# Patient Record
Sex: FEMALE
[De-identification: names, ages, dates, MRNs, and addresses within clinical notes are randomized per-mention and may not be internally consistent; named-entity substitution may affect disease eponyms.]

---

## 2018-11-08 LAB — CRC RECOMMENDATION EXT: NORMAL

## 2020-07-23 ENCOUNTER — NURSE TRIAGE (OUTPATIENT)
Dept: OTHER | Facility: CLINIC | Age: 65
End: 2020-07-23

## 2020-07-23 NOTE — TELEPHONE ENCOUNTER
Reason for Disposition   Fall in systolic BP > 20 mm Hg from normal and feeling dizzy, lightheaded, or weak    Protocols used: LOW BLOOD PRESSURE-ADULT-[place]    Pt calling for MMO benefit. She states last night she had an episode of her HR getting to 140 and her BP was low. This am she states her BP is 90/60. Her normal systolic BP is 006-778. She is also weak. No issues with HR this am.    Triage indicates for pt to be seen in the ED/ [facility] or PCP office with approval. Pt instructed to call PCP office and follow their instruction. Pt instructed to call back for any new or worsening symptoms. Patient verbalized understanding. Patient denies any other questions or concerns. Please do not respond to the triage nurse through this encounter. Any subsequent communication should be directly with the patient.

## 2021-06-23 ENCOUNTER — HISTORICAL (OUTPATIENT)
Dept: ADMINISTRATIVE | Facility: HOSPITAL | Age: 66
End: 2021-06-23

## 2021-06-23 LAB
ABS NEUT (OLG): 4.75 X10(3)/MCL (ref 2.1–9.2)
ALBUMIN SERPL-MCNC: 4 GM/DL (ref 3.4–4.8)
ALBUMIN/GLOB SERPL: 1.6 RATIO (ref 1.1–2)
ALP SERPL-CCNC: 87 UNIT/L (ref 40–150)
ALT SERPL-CCNC: 11 UNIT/L (ref 0–55)
AST SERPL-CCNC: 13 UNIT/L (ref 5–34)
BASOPHILS # BLD AUTO: 0.1 X10(3)/MCL (ref 0–0.2)
BASOPHILS NFR BLD AUTO: 1 %
BILIRUB SERPL-MCNC: 0.3 MG/DL
BILIRUBIN DIRECT+TOT PNL SERPL-MCNC: 0.1 MG/DL (ref 0–0.5)
BILIRUBIN DIRECT+TOT PNL SERPL-MCNC: 0.2 MG/DL (ref 0–0.8)
BUN SERPL-MCNC: 21.9 MG/DL (ref 9.8–20.1)
BURR CELLS BLD QL SMEAR: 1 (ref 0–0)
CALCIUM SERPL-MCNC: 9 MG/DL (ref 8.4–10.2)
CHLORIDE SERPL-SCNC: 103 MMOL/L (ref 98–107)
CHOLEST SERPL-MCNC: 204 MG/DL
CHOLEST/HDLC SERPL: 3 {RATIO} (ref 0–5)
CO2 SERPL-SCNC: 20 MMOL/L (ref 23–31)
CREAT SERPL-MCNC: 1.16 MG/DL (ref 0.55–1.02)
DEPRECATED CALCIDIOL+CALCIFEROL SERPL-MC: 36.8 NG/ML (ref 30–80)
EOSINOPHIL # BLD AUTO: 0.1 X10(3)/MCL (ref 0–0.9)
EOSINOPHIL NFR BLD AUTO: 2 %
ERYTHROCYTE [DISTWIDTH] IN BLOOD BY AUTOMATED COUNT: 12.2 % (ref 11.5–17)
EST. AVERAGE GLUCOSE BLD GHB EST-MCNC: 114 MG/DL
GLOBULIN SER-MCNC: 2.5 GM/DL (ref 2.4–3.5)
GLUCOSE SERPL-MCNC: 119 MG/DL (ref 82–115)
HBA1C MFR BLD: 5.6 %
HCT VFR BLD AUTO: 47.2 % (ref 37–47)
HDLC SERPL-MCNC: 63 MG/DL (ref 35–60)
HGB BLD-MCNC: 16.1 GM/DL (ref 12–16)
HIV 1+2 AB+HIV1 P24 AG SERPL QL IA: NONREACTIVE
LDLC SERPL CALC-MCNC: 110 MG/DL (ref 50–140)
LYMPHOCYTES # BLD AUTO: 1.1 X10(3)/MCL (ref 0.6–4.6)
LYMPHOCYTES NFR BLD AUTO: 17 %
MCH RBC QN AUTO: 30.7 PG (ref 27–31)
MCHC RBC AUTO-ENTMCNC: 34.1 GM/DL (ref 33–36)
MCV RBC AUTO: 89.9 FL (ref 80–94)
MONOCYTES # BLD AUTO: 0.4 X10(3)/MCL (ref 0.1–1.3)
MONOCYTES NFR BLD AUTO: 7 %
NEUTROPHILS # BLD AUTO: 4.75 X10(3)/MCL (ref 2.1–9.2)
NEUTROPHILS NFR BLD AUTO: 73 %
PLATELET # BLD AUTO: 344 X10(3)/MCL (ref 130–400)
PLATELET # BLD EST: NORMAL 10*3/UL
PMV BLD AUTO: 11.1 FL (ref 7.4–10.4)
POIKILOCYTOSIS BLD QL SMEAR: 1
POTASSIUM SERPL-SCNC: 3 MMOL/L (ref 3.5–5.1)
PROT SERPL-MCNC: 6.5 GM/DL (ref 5.8–7.6)
RBC # BLD AUTO: 5.25 X10(6)/MCL (ref 4.2–5.4)
SODIUM SERPL-SCNC: 142 MMOL/L (ref 136–145)
TRIGL SERPL-MCNC: 155 MG/DL (ref 37–140)
TROPONIN I SERPL-MCNC: <0.01 NG/ML (ref 0–0.04)
VLDLC SERPL CALC-MCNC: 31 MG/DL
WBC # SPEC AUTO: 6.5 X10(3)/MCL (ref 4.5–11.5)

## 2021-07-14 ENCOUNTER — HISTORICAL (OUTPATIENT)
Dept: ADMINISTRATIVE | Facility: HOSPITAL | Age: 66
End: 2021-07-14

## 2021-07-14 LAB
ALBUMIN SERPL-MCNC: 3.6 GM/DL (ref 3.4–4.8)
ALBUMIN/GLOB SERPL: 1.3 RATIO (ref 1.1–2)
ALP SERPL-CCNC: 76 UNIT/L (ref 40–150)
ALT SERPL-CCNC: 12 UNIT/L (ref 0–55)
APPEARANCE, UA: CLEAR
AST SERPL-CCNC: 21 UNIT/L (ref 5–34)
BACTERIA SPEC CULT: ABNORMAL /HPF
BILIRUB SERPL-MCNC: 0.4 MG/DL
BILIRUB UR QL STRIP: NEGATIVE
BILIRUBIN DIRECT+TOT PNL SERPL-MCNC: 0.1 MG/DL (ref 0–0.5)
BILIRUBIN DIRECT+TOT PNL SERPL-MCNC: 0.3 MG/DL (ref 0–0.8)
BUN SERPL-MCNC: 15.2 MG/DL (ref 9.8–20.1)
CALCIUM SERPL-MCNC: 8.9 MG/DL (ref 8.4–10.2)
CHLORIDE SERPL-SCNC: 106 MMOL/L (ref 98–107)
CO2 SERPL-SCNC: 20 MMOL/L (ref 23–31)
COLOR UR: YELLOW
CREAT SERPL-MCNC: 0.93 MG/DL (ref 0.55–1.02)
GLOBULIN SER-MCNC: 2.8 GM/DL (ref 2.4–3.5)
GLUCOSE (UA): NEGATIVE
GLUCOSE SERPL-MCNC: 143 MG/DL (ref 82–115)
HGB UR QL STRIP: NEGATIVE
KETONES UR QL STRIP: NEGATIVE
LEUKOCYTE ESTERASE UR QL STRIP: ABNORMAL
NITRITE UR QL STRIP: POSITIVE
PH UR STRIP: 5 [PH] (ref 5–9)
POTASSIUM SERPL-SCNC: 5.4 MMOL/L (ref 3.5–5.1)
PROT SERPL-MCNC: 6.4 GM/DL (ref 5.8–7.6)
PROT UR QL STRIP: NEGATIVE
RBC #/AREA URNS HPF: ABNORMAL /[HPF]
SODIUM SERPL-SCNC: 138 MMOL/L (ref 136–145)
SP GR UR STRIP: 1.01 (ref 1–1.03)
SQUAMOUS EPITHELIAL, UA: ABNORMAL /HPF (ref 0–4)
UROBILINOGEN UR STRIP-ACNC: 0.2
WBC #/AREA URNS HPF: 9 /HPF (ref 0–3)

## 2022-02-03 ENCOUNTER — HISTORICAL (OUTPATIENT)
Dept: ADMINISTRATIVE | Facility: HOSPITAL | Age: 67
End: 2022-02-03

## 2022-02-03 LAB
ALBUMIN SERPL-MCNC: 3.8 G/DL (ref 3.4–4.8)
ALBUMIN/GLOB SERPL: 1.5 {RATIO} (ref 1.1–2)
ALP SERPL-CCNC: 76 U/L (ref 40–150)
ALT SERPL-CCNC: 11 U/L (ref 0–55)
AST SERPL-CCNC: 13 U/L (ref 5–34)
BILIRUB SERPL-MCNC: 0.5 MG/DL
BILIRUBIN DIRECT+TOT PNL SERPL-MCNC: 0.2 (ref 0–0.5)
BILIRUBIN DIRECT+TOT PNL SERPL-MCNC: 0.3 (ref 0–0.8)
BUN SERPL-MCNC: 15.7 MG/DL (ref 9.8–20.1)
CALCIUM SERPL-MCNC: 9.7 MG/DL (ref 8.7–10.5)
CHLORIDE SERPL-SCNC: 104 MMOL/L (ref 98–107)
CO2 SERPL-SCNC: 26 MMOL/L (ref 23–31)
CREAT SERPL-MCNC: 0.76 MG/DL (ref 0.55–1.02)
EST. AVERAGE GLUCOSE BLD GHB EST-MCNC: 108.3 MG/DL
GLOBULIN SER-MCNC: 2.6 G/DL (ref 2.4–3.5)
GLUCOSE SERPL-MCNC: 88 MG/DL (ref 82–115)
HBA1C MFR BLD: 5.4 %
HEMOLYSIS INTERF INDEX SERPL-ACNC: 6
ICTERIC INTERF INDEX SERPL-ACNC: 0
LIPEMIC INTERF INDEX SERPL-ACNC: 1
POTASSIUM SERPL-SCNC: 3.5 MMOL/L (ref 3.5–5.1)
PROT SERPL-MCNC: 6.4 G/DL (ref 5.8–7.6)
SODIUM SERPL-SCNC: 140 MMOL/L (ref 136–145)

## 2022-04-10 ENCOUNTER — HISTORICAL (OUTPATIENT)
Dept: ADMINISTRATIVE | Facility: HOSPITAL | Age: 67
End: 2022-04-10

## 2022-04-28 VITALS
OXYGEN SATURATION: 98 % | WEIGHT: 122.13 LBS | DIASTOLIC BLOOD PRESSURE: 90 MMHG | SYSTOLIC BLOOD PRESSURE: 138 MMHG | HEIGHT: 59 IN | BODY MASS INDEX: 24.62 KG/M2

## 2022-12-20 ENCOUNTER — DOCUMENTATION ONLY (OUTPATIENT)
Dept: INTERNAL MEDICINE | Facility: CLINIC | Age: 67
End: 2022-12-20